# Patient Record
Sex: FEMALE | NOT HISPANIC OR LATINO | URBAN - METROPOLITAN AREA
[De-identification: names, ages, dates, MRNs, and addresses within clinical notes are randomized per-mention and may not be internally consistent; named-entity substitution may affect disease eponyms.]

---

## 2019-06-10 ENCOUNTER — IMPORTED ENCOUNTER (OUTPATIENT)
Dept: URBAN - METROPOLITAN AREA CLINIC 1 | Facility: CLINIC | Age: 84
End: 2019-06-10

## 2019-06-10 PROBLEM — R73.09: Noted: 2019-06-10

## 2019-06-10 PROBLEM — H04.123: Noted: 2019-06-10

## 2019-06-10 PROBLEM — H16.143: Noted: 2019-06-10

## 2019-06-10 PROCEDURE — 92004 COMPRE OPH EXAM NEW PT 1/>: CPT

## 2019-06-10 NOTE — PATIENT DISCUSSION
1.  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 Stressed the importance of strong glucose control. Advised of importance of at least yearly dilated examinations but to contact us immediately for any problems or concerns. 2. GODWIN w/ PEK OU- Patient was previously taking Restasis no longer taking at this time. Use ATs TID OU Routinely. Consider Plugs if patient symptomatic. 3.  Hypertensive Retinopathy OU-Stable continue HTN Control4. PVD w/o Tear OD - Stable RD precautions. 5.  Pseudophakia OU (PMG- ZMBOO OU) Letter to PCP Alexi deferredReturn for an appointment in 1 yr 27 with Dr. Virginia Staton.

## 2020-11-25 ENCOUNTER — IMPORTED ENCOUNTER (OUTPATIENT)
Dept: URBAN - METROPOLITAN AREA CLINIC 1 | Facility: CLINIC | Age: 85
End: 2020-11-25

## 2020-11-25 PROBLEM — R73.09: Noted: 2020-11-25

## 2020-11-25 PROBLEM — H04.123: Noted: 2020-11-25

## 2020-11-25 PROBLEM — H43.811: Noted: 2020-11-25

## 2020-11-25 PROBLEM — Z96.1: Noted: 2020-11-25

## 2020-11-25 PROBLEM — H16.143: Noted: 2020-11-25

## 2020-11-25 PROBLEM — H35.033: Noted: 2020-11-25

## 2020-11-25 PROCEDURE — 92014 COMPRE OPH EXAM EST PT 1/>: CPT

## 2020-11-25 NOTE — PATIENT DISCUSSION
1.  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 Stressed the importance of strong glucose control. Advised of importance of at least yearly dilated examinations but to contact us immediately for any problems or concerns. 2. GODWIN w/ PEK OU- H/o Restasis no longer taking. Recommend ATs TID OU routinely 3. Pseudophakia OU - (TMF ZMBOO OU) 4. GR I Hypertensive Retinopathy OU- Stable continue HTN Control5. PVD w/o Tear OD - RD precautions. Patient deferred Man[facility] Rx today. Return for an appointment in 1 year 27 with Dr. Jason Wells.

## 2021-11-29 ENCOUNTER — IMPORTED ENCOUNTER (OUTPATIENT)
Dept: URBAN - METROPOLITAN AREA CLINIC 1 | Facility: CLINIC | Age: 86
End: 2021-11-29

## 2021-11-29 PROBLEM — H43.811: Noted: 2021-11-29

## 2021-11-29 PROBLEM — H16.143: Noted: 2021-11-29

## 2021-11-29 PROBLEM — H04.123: Noted: 2021-11-29

## 2021-11-29 PROBLEM — H35.033: Noted: 2021-11-29

## 2021-11-29 PROBLEM — Z96.1: Noted: 2021-11-29

## 2021-11-29 PROBLEM — R73.09: Noted: 2021-11-29

## 2021-11-29 PROCEDURE — 99214 OFFICE O/P EST MOD 30 MIN: CPT

## 2021-11-29 NOTE — PATIENT DISCUSSION
1.  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 Stressed the importance of strong glucose control. Advised of importance of at least yearly dilated examinations but to contact us immediately for any problems or concerns. 2. GODWIN w/ PEK OU -- Recommended ATs TID OU routinely. H/o Restasis. 3.  Pseudophakia OU -- Doing well. 4.  GR I Hypertensive Retinopathy OU -- Stable continue HTN Control5. PVD w/o Tear OD -- Stable. RD Precautions. Patient defers the refraction at today's visit    Return for an appointment in 1 year 27 with Dr. Vidya Alamo.

## 2022-04-02 ASSESSMENT — VISUAL ACUITY
OS_CC: 20/20
OS_CC: 20/25-1
OS_SC: J1+
OD_CC: 20/20-2
OS_CC: 20/20
OU_SC: J1+-1
OD_SC: J1+
OD_CC: 20/20
OD_SC: J1
OS_SC: J1
OD_CC: 20/20

## 2022-04-02 ASSESSMENT — TONOMETRY
OS_IOP_MMHG: 15
OD_IOP_MMHG: 15
OD_IOP_MMHG: 16
OS_IOP_MMHG: 16
OS_IOP_MMHG: 15
OD_IOP_MMHG: 15

## 2022-11-29 ENCOUNTER — COMPREHENSIVE EXAM (OUTPATIENT)
Dept: URBAN - METROPOLITAN AREA CLINIC 1 | Facility: CLINIC | Age: 87
End: 2022-11-29

## 2022-11-29 DIAGNOSIS — Z96.1: ICD-10-CM

## 2022-11-29 DIAGNOSIS — H04.123: ICD-10-CM

## 2022-11-29 DIAGNOSIS — H16.143: ICD-10-CM

## 2022-11-29 DIAGNOSIS — E11.9: ICD-10-CM

## 2022-11-29 DIAGNOSIS — H35.033: ICD-10-CM

## 2022-11-29 DIAGNOSIS — H26.493: ICD-10-CM

## 2022-11-29 DIAGNOSIS — H43.811: ICD-10-CM

## 2022-11-29 PROCEDURE — 99214 OFFICE O/P EST MOD 30 MIN: CPT

## 2022-11-29 ASSESSMENT — VISUAL ACUITY
OD_SC: J2
OS_SC: J1
OS_SC: 20/20-2
OD_SC: 20/25

## 2022-11-29 ASSESSMENT — TONOMETRY
OS_IOP_MMHG: 16
OD_IOP_MMHG: 16

## 2023-12-04 ENCOUNTER — COMPREHENSIVE EXAM (OUTPATIENT)
Dept: URBAN - METROPOLITAN AREA CLINIC 1 | Facility: CLINIC | Age: 88
End: 2023-12-04

## 2023-12-04 DIAGNOSIS — H04.123: ICD-10-CM

## 2023-12-04 DIAGNOSIS — H26.493: ICD-10-CM

## 2023-12-04 DIAGNOSIS — E11.9: ICD-10-CM

## 2023-12-04 DIAGNOSIS — H16.143: ICD-10-CM

## 2023-12-04 PROCEDURE — 99214 OFFICE O/P EST MOD 30 MIN: CPT

## 2023-12-04 ASSESSMENT — VISUAL ACUITY
OS_SC: 20/20-2
OS_SC: J1
OS_BAT: 20/30
OD_SC: 20/20-1
OD_BAT: 20/30
OD_SC: J2

## 2023-12-04 ASSESSMENT — TONOMETRY
OS_IOP_MMHG: 14
OD_IOP_MMHG: 15

## 2024-12-05 ENCOUNTER — COMPREHENSIVE EXAM (OUTPATIENT)
Age: 89
End: 2024-12-05

## 2024-12-05 DIAGNOSIS — H26.493: ICD-10-CM

## 2024-12-05 DIAGNOSIS — H35.033: ICD-10-CM

## 2024-12-05 DIAGNOSIS — E11.9: ICD-10-CM

## 2024-12-05 DIAGNOSIS — H04.123: ICD-10-CM

## 2024-12-05 DIAGNOSIS — H16.143: ICD-10-CM

## 2024-12-05 PROCEDURE — 99214 OFFICE O/P EST MOD 30 MIN: CPT
